# Patient Record
Sex: MALE | Race: WHITE | NOT HISPANIC OR LATINO | URBAN - METROPOLITAN AREA
[De-identification: names, ages, dates, MRNs, and addresses within clinical notes are randomized per-mention and may not be internally consistent; named-entity substitution may affect disease eponyms.]

---

## 2017-01-24 ENCOUNTER — OUTPATIENT (OUTPATIENT)
Dept: OUTPATIENT SERVICES | Facility: HOSPITAL | Age: 62
LOS: 1 days | End: 2017-01-24
Payer: COMMERCIAL

## 2017-01-24 ENCOUNTER — APPOINTMENT (OUTPATIENT)
Dept: UROLOGY | Facility: CLINIC | Age: 62
End: 2017-01-24

## 2017-01-24 ENCOUNTER — APPOINTMENT (OUTPATIENT)
Dept: CV DIAGNOSTICS | Facility: HOSPITAL | Age: 62
End: 2017-01-24

## 2017-01-24 DIAGNOSIS — E78.5 HYPERLIPIDEMIA, UNSPECIFIED: ICD-10-CM

## 2017-01-24 DIAGNOSIS — I10 ESSENTIAL (PRIMARY) HYPERTENSION: ICD-10-CM

## 2017-01-24 PROCEDURE — 93017 CV STRESS TEST TRACING ONLY: CPT

## 2017-01-24 PROCEDURE — 78452 HT MUSCLE IMAGE SPECT MULT: CPT

## 2017-01-24 PROCEDURE — 93016 CV STRESS TEST SUPVJ ONLY: CPT

## 2017-01-24 PROCEDURE — A9500: CPT

## 2017-01-24 PROCEDURE — 78452 HT MUSCLE IMAGE SPECT MULT: CPT | Mod: 26

## 2017-01-24 PROCEDURE — 93018 CV STRESS TEST I&R ONLY: CPT

## 2017-01-25 LAB — PSA SERPL-MCNC: 6.81 NG/ML

## 2017-02-13 ENCOUNTER — APPOINTMENT (OUTPATIENT)
Dept: CARDIOLOGY | Facility: CLINIC | Age: 62
End: 2017-02-13

## 2017-02-13 ENCOUNTER — APPOINTMENT (OUTPATIENT)
Dept: SURGERY | Facility: CLINIC | Age: 62
End: 2017-02-13

## 2017-02-13 VITALS
BODY MASS INDEX: 28.27 KG/M2 | DIASTOLIC BLOOD PRESSURE: 80 MMHG | SYSTOLIC BLOOD PRESSURE: 131 MMHG | HEART RATE: 70 BPM | WEIGHT: 197 LBS | OXYGEN SATURATION: 97 %

## 2017-02-13 VITALS
HEIGHT: 70 IN | WEIGHT: 195 LBS | HEART RATE: 62 BPM | SYSTOLIC BLOOD PRESSURE: 137 MMHG | DIASTOLIC BLOOD PRESSURE: 74 MMHG | BODY MASS INDEX: 27.92 KG/M2

## 2017-02-13 DIAGNOSIS — I10 ESSENTIAL (PRIMARY) HYPERTENSION: ICD-10-CM

## 2017-02-13 DIAGNOSIS — I48.0 PAROXYSMAL ATRIAL FIBRILLATION: ICD-10-CM

## 2017-02-13 DIAGNOSIS — Z78.9 OTHER SPECIFIED HEALTH STATUS: ICD-10-CM

## 2017-02-13 DIAGNOSIS — I45.10 UNSPECIFIED RIGHT BUNDLE-BRANCH BLOCK: ICD-10-CM

## 2017-02-13 DIAGNOSIS — R07.89 OTHER CHEST PAIN: ICD-10-CM

## 2017-02-13 DIAGNOSIS — E78.5 HYPERLIPIDEMIA, UNSPECIFIED: ICD-10-CM

## 2017-09-11 ENCOUNTER — APPOINTMENT (OUTPATIENT)
Dept: SURGERY | Facility: CLINIC | Age: 62
End: 2017-09-11
Payer: COMMERCIAL

## 2017-09-11 PROCEDURE — 99213 OFFICE O/P EST LOW 20 MIN: CPT

## 2017-11-15 ENCOUNTER — MEDICATION RENEWAL (OUTPATIENT)
Age: 62
End: 2017-11-15

## 2018-01-09 ENCOUNTER — APPOINTMENT (OUTPATIENT)
Dept: CARDIOLOGY | Facility: CLINIC | Age: 63
End: 2018-01-09

## 2018-01-12 ENCOUNTER — APPOINTMENT (OUTPATIENT)
Dept: UROLOGY | Facility: CLINIC | Age: 63
End: 2018-01-12
Payer: COMMERCIAL

## 2018-01-12 PROCEDURE — 99214 OFFICE O/P EST MOD 30 MIN: CPT

## 2018-06-19 ENCOUNTER — MEDICATION RENEWAL (OUTPATIENT)
Age: 63
End: 2018-06-19

## 2018-09-12 ENCOUNTER — APPOINTMENT (OUTPATIENT)
Dept: SURGERY | Facility: CLINIC | Age: 63
End: 2018-09-12
Payer: COMMERCIAL

## 2018-09-12 DIAGNOSIS — E04.2 NONTOXIC MULTINODULAR GOITER: ICD-10-CM

## 2018-09-12 PROCEDURE — 99213 OFFICE O/P EST LOW 20 MIN: CPT

## 2018-09-18 ENCOUNTER — FORM ENCOUNTER (OUTPATIENT)
Age: 63
End: 2018-09-18

## 2018-09-19 ENCOUNTER — APPOINTMENT (OUTPATIENT)
Dept: ULTRASOUND IMAGING | Facility: CLINIC | Age: 63
End: 2018-09-19
Payer: COMMERCIAL

## 2018-09-19 ENCOUNTER — OUTPATIENT (OUTPATIENT)
Dept: OUTPATIENT SERVICES | Facility: HOSPITAL | Age: 63
LOS: 1 days | End: 2018-09-19
Payer: COMMERCIAL

## 2018-09-19 DIAGNOSIS — Z00.8 ENCOUNTER FOR OTHER GENERAL EXAMINATION: ICD-10-CM

## 2018-09-19 PROCEDURE — 76536 US EXAM OF HEAD AND NECK: CPT

## 2018-09-19 PROCEDURE — 76536 US EXAM OF HEAD AND NECK: CPT | Mod: 26

## 2018-12-14 ENCOUNTER — APPOINTMENT (OUTPATIENT)
Dept: UROLOGY | Facility: CLINIC | Age: 63
End: 2018-12-14
Payer: COMMERCIAL

## 2018-12-14 PROCEDURE — 99214 OFFICE O/P EST MOD 30 MIN: CPT | Mod: 25

## 2018-12-14 PROCEDURE — 51798 US URINE CAPACITY MEASURE: CPT

## 2018-12-17 ENCOUNTER — OTHER (OUTPATIENT)
Age: 63
End: 2018-12-17

## 2018-12-17 LAB — PSA SERPL-MCNC: 13.09 NG/ML

## 2018-12-19 LAB — PSA SERPL-MCNC: 13.14 NG/ML

## 2018-12-30 ENCOUNTER — FORM ENCOUNTER (OUTPATIENT)
Age: 63
End: 2018-12-30

## 2018-12-31 ENCOUNTER — OUTPATIENT (OUTPATIENT)
Dept: OUTPATIENT SERVICES | Facility: HOSPITAL | Age: 63
LOS: 1 days | End: 2018-12-31
Payer: COMMERCIAL

## 2018-12-31 ENCOUNTER — APPOINTMENT (OUTPATIENT)
Dept: MRI IMAGING | Facility: IMAGING CENTER | Age: 63
End: 2018-12-31
Payer: COMMERCIAL

## 2018-12-31 DIAGNOSIS — R97.20 ELEVATED PROSTATE SPECIFIC ANTIGEN [PSA]: ICD-10-CM

## 2018-12-31 PROCEDURE — 82565 ASSAY OF CREATININE: CPT

## 2018-12-31 PROCEDURE — 72197 MRI PELVIS W/O & W/DYE: CPT | Mod: 26

## 2018-12-31 PROCEDURE — 72197 MRI PELVIS W/O & W/DYE: CPT

## 2018-12-31 PROCEDURE — A9585: CPT

## 2019-07-02 ENCOUNTER — APPOINTMENT (OUTPATIENT)
Dept: UROLOGY | Facility: CLINIC | Age: 64
End: 2019-07-02
Payer: COMMERCIAL

## 2019-07-02 VITALS
DIASTOLIC BLOOD PRESSURE: 61 MMHG | RESPIRATION RATE: 15 BRPM | WEIGHT: 180 LBS | BODY MASS INDEX: 25.77 KG/M2 | SYSTOLIC BLOOD PRESSURE: 105 MMHG | HEIGHT: 70 IN | HEART RATE: 62 BPM | TEMPERATURE: 98 F

## 2019-07-02 PROCEDURE — 99214 OFFICE O/P EST MOD 30 MIN: CPT

## 2019-07-02 NOTE — ASSESSMENT
[FreeTextEntry1] : Which has some time today discussing various options for further additional treatment for his BPH and urinary symptoms. We discussed the use of 5 alpha reductase inhibitors as well as PDE 5 inhibitors or anticholinergic medications. I gave him a list of medications to consider and he will do some research on his own before he decides to move forward with any of them. He was briefly on finasteride in the past but had stopped it for reasons that he could not remember. \par \par We also discussed surgical interventions for BPH and bladder outlet obstruction including transurethral resection of the prostate or laser ablative procedures of the prostate gland. We also reviewed newer steam ablation techniques.He is not ready to consider any procedural option at this time.\par \par In terms of his PSA and prostate cancer screening followup, I will recheck the PSA level today to monitor the level. Unless there is a clear indication to consider additional biopsy, we will continue to monitor him moving forward with PSA and examination of the prostate. I offered him an exam of the prostate today which he declined.

## 2019-07-02 NOTE — PHYSICAL EXAM
[General Appearance - Well Developed] : well developed [Normal Appearance] : normal appearance [General Appearance - Well Nourished] : well nourished [Well Groomed] : well groomed [Abdomen Soft] : soft [General Appearance - In No Acute Distress] : no acute distress [Abdomen Tenderness] : non-tender [Costovertebral Angle Tenderness] : no ~M costovertebral angle tenderness [Urinary Bladder Findings] : the bladder was normal on palpation [Urethral Meatus] : meatus normal [Scrotum] : the scrotum was normal [Testes Mass (___cm)] : there were no testicular masses [Edema] : no peripheral edema [] : no respiratory distress [Respiration, Rhythm And Depth] : normal respiratory rhythm and effort [Exaggerated Use Of Accessory Muscles For Inspiration] : no accessory muscle use [Oriented To Time, Place, And Person] : oriented to person, place, and time [Affect] : the affect was normal [Mood] : the mood was normal [Normal Station and Gait] : the gait and station were normal for the patient's age [Not Anxious] : not anxious [No Focal Deficits] : no focal deficits [No Palpable Adenopathy] : no palpable adenopathy

## 2019-07-02 NOTE — HISTORY OF PRESENT ILLNESS
[FreeTextEntry1] : Delon Nair returns to the office today. He is a 64-year-old man who was a prior patient of Dr. Link with history of PSA elevation and BPH with associated lower urinary tract symptoms. He is currently using Uroxatral for his urinary symptoms. He is mostly bothered by urinary urgency and frequency but also experiences nocturia x2. He typically avoids drinking before going on a house and he also a few sheets sports and tries to avoid fluid for about 5 hours before he does this were to avoid urinary urgency or frequency. He says he also has some bad night for he wakes up about every 10 or 15 minutes, up to 10 times over night to urinate but this is relatively uncommon. He would like to note there are other treatments that he could consider for management of his BPH-related urinary symptoms.\par \par His PSA level has been elevated for many years. He has undergone 2 separate prostate biopsies, most recently about 10 years ago. His PSA was checked in December 2018 and found to be approximately 13 ng/mL. He underwent MRI of the prostate at that time which showed a prostate volume of 95 cubic centimeters with PIRAD 2 designation and his PSA subsequently declined down to 8 ng/mL. Based on these findings he was recommended to continue to monitor his PSA but not that he should undergo any additional biopsies.\par \par The patient denies any abdominal pain or flank pain. There is no pain with urination. He has no fevers or chills and denies nausea or vomiting.

## 2019-07-03 ENCOUNTER — CHART COPY (OUTPATIENT)
Age: 64
End: 2019-07-03

## 2019-07-03 LAB
PSA FREE FLD-MCNC: 12 %
PSA FREE SERPL-MCNC: 1.47 NG/ML
PSA SERPL-MCNC: 11.8 NG/ML

## 2019-12-10 ENCOUNTER — APPOINTMENT (OUTPATIENT)
Dept: UROLOGY | Facility: CLINIC | Age: 64
End: 2019-12-10

## 2019-12-13 ENCOUNTER — APPOINTMENT (OUTPATIENT)
Dept: UROLOGY | Facility: CLINIC | Age: 64
End: 2019-12-13

## 2020-01-10 ENCOUNTER — APPOINTMENT (OUTPATIENT)
Dept: UROLOGY | Facility: CLINIC | Age: 65
End: 2020-01-10
Payer: COMMERCIAL

## 2020-01-10 PROCEDURE — 99214 OFFICE O/P EST MOD 30 MIN: CPT

## 2020-01-10 NOTE — PHYSICAL EXAM
[Normal Appearance] : normal appearance [General Appearance - Well Developed] : well developed [General Appearance - Well Nourished] : well nourished [Well Groomed] : well groomed [General Appearance - In No Acute Distress] : no acute distress [Abdomen Soft] : soft [Urethral Meatus] : meatus normal [Costovertebral Angle Tenderness] : no ~M costovertebral angle tenderness [Abdomen Tenderness] : non-tender [Scrotum] : the scrotum was normal [Testes Mass (___cm)] : there were no testicular masses [No Prostate Nodules] : no prostate nodules [Urinary Bladder Findings] : the bladder was normal on palpation [] : no rash [Edema] : no peripheral edema [Respiration, Rhythm And Depth] : normal respiratory rhythm and effort [Exaggerated Use Of Accessory Muscles For Inspiration] : no accessory muscle use [Oriented To Time, Place, And Person] : oriented to person, place, and time [Affect] : the affect was normal [Not Anxious] : not anxious [Mood] : the mood was normal [No Palpable Adenopathy] : no palpable adenopathy [No Focal Deficits] : no focal deficits [Normal Station and Gait] : the gait and station were normal for the patient's age

## 2020-01-10 NOTE — ASSESSMENT
[FreeTextEntry1] : For management of the lower urinary tract symptoms, he will continue on daily alfuzosin 10 mg.  We did again review the medical options of using finasteride or Cialis on a daily basis but he would prefer not to use either at this time.  His urinary symptoms seem to be under reasonable control at this time point and improved since his prior visit.\par \par Prostate examination today shows enlargement which is smooth and symmetric.  There is no evidence of any focal nodularity or induration to suggest the presence of palpable prostate cancer.\par In terms of his PSA and prostate cancer screening followup, I will recheck the PSA level today to monitor the level. Unless there is a clear indication to consider additional biopsy, we will continue to monitor him moving forward with PSA and examination of the prostate.  If the PSA shows any significant increase, repeat MRI of the prostate and possible biopsy may be considered.\par

## 2020-01-10 NOTE — HISTORY OF PRESENT ILLNESS
[FreeTextEntry1] : Mr. Lala is a 64 yom who presents for follow-up for BPH symptoms.  He was last seen July of this year.  His last PSA was ~11, however his last MRI in December of 2018 demonstrated a PIRADS 2 lesion and a significantly enlarged prostate (95cc).  He is interested in obtaining a repeat PSA today.\par \par As for his urinary symptoms, he says his nocturia is intermittent, occasionally as often as 4 times a night but if he exercises he can sleep through the night.  His symptoms are not of significant distress to him.  He was prescribed finasteride and cialis at last visit.  The patient states that he did not ever use finasteride that was discussed with him at his prior visit and he has been using Cialis for management of erectile dysfunction but on an intermittent basis.  We have also discussed that as used for BPH related urinary symptoms but he had decided against using it in that fashion.

## 2020-01-13 LAB
PSA FREE FLD-MCNC: 16 %
PSA FREE SERPL-MCNC: 1.28 NG/ML
PSA SERPL-MCNC: 7.97 NG/ML

## 2020-02-26 ENCOUNTER — APPOINTMENT (OUTPATIENT)
Dept: GASTROENTEROLOGY | Facility: CLINIC | Age: 65
End: 2020-02-26
Payer: COMMERCIAL

## 2020-02-26 VITALS
TEMPERATURE: 98.4 F | BODY MASS INDEX: 27.77 KG/M2 | HEIGHT: 70 IN | HEART RATE: 59 BPM | WEIGHT: 194 LBS | DIASTOLIC BLOOD PRESSURE: 70 MMHG | SYSTOLIC BLOOD PRESSURE: 110 MMHG | OXYGEN SATURATION: 97 %

## 2020-02-26 DIAGNOSIS — Z00.00 ENCOUNTER FOR GENERAL ADULT MEDICAL EXAMINATION W/OUT ABNORMAL FINDINGS: ICD-10-CM

## 2020-02-26 DIAGNOSIS — Z12.11 ENCOUNTER FOR SCREENING FOR MALIGNANT NEOPLASM OF COLON: ICD-10-CM

## 2020-02-26 PROCEDURE — 99203 OFFICE O/P NEW LOW 30 MIN: CPT

## 2020-02-26 RX ORDER — LOSARTAN POTASSIUM 50 MG/1
50 TABLET, FILM COATED ORAL
Qty: 90 | Refills: 0 | Status: ACTIVE | COMMUNITY
Start: 2019-10-14

## 2020-02-26 NOTE — ASSESSMENT
[FreeTextEntry1] : Impression and plan\par \par Patient presents to the office today to arrange for screening colonoscopy and to arrange for upper endoscopy for evaluation of possible esophageal complaints. The patient was advised of the risks benefits and alternatives of the examination he chooses to go ahead. We'll make further suggestions after above testing is completed and advise accordingly.

## 2020-02-26 NOTE — HISTORY OF PRESENT ILLNESS
[FreeTextEntry1] : Patient presents to the office today to arrange for colonoscopy and upper endoscopy. Patient has been experiencing some substernal chest discomfort and sought out his cardiologist who performed recent testing. Patient describes a pressure and heartburn type pain with some difficulty swallowing. Patient is also due for colonoscopy as it has been more than 5 years since his last examination. Patient currently denies nausea vomiting fever chills rectal bleeding or melena. He does notice occasional change in bowel habits. Patient is under investigation by urologist for elevated PSA.

## 2020-03-25 ENCOUNTER — APPOINTMENT (OUTPATIENT)
Dept: GASTROENTEROLOGY | Facility: AMBULATORY MEDICAL SERVICES | Age: 65
End: 2020-03-25

## 2020-07-17 ENCOUNTER — LABORATORY RESULT (OUTPATIENT)
Age: 65
End: 2020-07-17

## 2020-07-21 ENCOUNTER — APPOINTMENT (OUTPATIENT)
Dept: GASTROENTEROLOGY | Facility: AMBULATORY MEDICAL SERVICES | Age: 65
End: 2020-07-21
Payer: MEDICARE

## 2020-07-21 PROCEDURE — 43239 EGD BIOPSY SINGLE/MULTIPLE: CPT

## 2020-07-21 PROCEDURE — 45380 COLONOSCOPY AND BIOPSY: CPT

## 2020-10-23 ENCOUNTER — APPOINTMENT (OUTPATIENT)
Dept: UROLOGY | Facility: CLINIC | Age: 65
End: 2020-10-23
Payer: MEDICARE

## 2020-10-23 VITALS
DIASTOLIC BLOOD PRESSURE: 70 MMHG | WEIGHT: 190 LBS | HEART RATE: 66 BPM | RESPIRATION RATE: 17 BRPM | SYSTOLIC BLOOD PRESSURE: 120 MMHG | HEIGHT: 70 IN | BODY MASS INDEX: 27.2 KG/M2

## 2020-10-23 VITALS — TEMPERATURE: 97.1 F

## 2020-10-23 PROCEDURE — 99214 OFFICE O/P EST MOD 30 MIN: CPT

## 2020-10-23 RX ORDER — ALFUZOSIN HCL 10 MG/1
10 TABLET, EXTENDED RELEASE ORAL
Refills: 0 | Status: COMPLETED | COMMUNITY
End: 2020-10-23

## 2020-10-23 RX ORDER — SODIUM SULFATE, POTASSIUM SULFATE, MAGNESIUM SULFATE 17.5; 3.13; 1.6 G/ML; G/ML; G/ML
17.5-3.13-1.6 SOLUTION, CONCENTRATE ORAL
Qty: 1 | Refills: 0 | Status: COMPLETED | COMMUNITY
Start: 2020-02-26 | End: 2020-10-23

## 2020-10-23 RX ORDER — PANTOPRAZOLE 40 MG/1
40 TABLET, DELAYED RELEASE ORAL
Qty: 30 | Refills: 0 | Status: COMPLETED | COMMUNITY
Start: 2020-02-19 | End: 2020-10-23

## 2020-10-23 RX ORDER — PANTOPRAZOLE 40 MG/1
40 TABLET, DELAYED RELEASE ORAL
Qty: 90 | Refills: 3 | Status: COMPLETED | COMMUNITY
Start: 2020-07-23 | End: 2020-10-23

## 2020-10-23 RX ORDER — TADALAFIL 5 MG/1
5 TABLET ORAL
Qty: 90 | Refills: 3 | Status: COMPLETED | COMMUNITY
Start: 2019-07-29 | End: 2020-10-23

## 2020-10-25 LAB
APPEARANCE: CLEAR
BACTERIA UR CULT: NORMAL
BACTERIA: NEGATIVE
BILIRUBIN URINE: NEGATIVE
BLOOD URINE: NEGATIVE
COLOR: YELLOW
GLUCOSE QUALITATIVE U: NEGATIVE
HYALINE CASTS: 3 /LPF
KETONES URINE: NEGATIVE
LEUKOCYTE ESTERASE URINE: NEGATIVE
MICROSCOPIC-UA: NORMAL
NITRITE URINE: NEGATIVE
PH URINE: 5.5
PROTEIN URINE: NORMAL
PSA FREE FLD-MCNC: 18 %
PSA FREE SERPL-MCNC: 1.63 NG/ML
PSA SERPL-MCNC: 9.12 NG/ML
RED BLOOD CELLS URINE: 3 /HPF
SPECIFIC GRAVITY URINE: 1.02
SQUAMOUS EPITHELIAL CELLS: 0 /HPF
UROBILINOGEN URINE: NORMAL
WHITE BLOOD CELLS URINE: 1 /HPF

## 2020-10-25 NOTE — ASSESSMENT
[FreeTextEntry1] : I rechecked his PSA level today which is now 9.12 ng/mL.  I would interpret this as stable based on his prior PSA levels which have fluctuated over the last 3 to 4 years anywhere between 6.8 and 13.14 ng/mL.  I do not think he needs to consider undergoing either additional imaging or biopsy of his prostate at this point.\par \par With regard to the lower urinary tract symptoms, he has been on alfuzosin previously but feels as though it is not working all that well for him.  I have recommended that we change the alpha-blocker and I have added finasteride as well to try to help mitigate the symptoms.  We discussed the time course of expected effects with these medications as well as side effects that may be experienced.  He understands that there may be some sexual side effects with this including reduction of the ejaculation volume as well as reduction of the libido.  He is willing to endure the side effects for now given his increased bother with the urinary symptoms.  We also reviewed the fact that his prostate is quite significantly enlarged and that medications at times are not adequate to mitigate symptoms and in those cases, surgical intervention to relieve bladder outlet obstruction could also be considered.  He will trial the new medication combination and let me know how he does over the next several months.  We will then reassess the decision for outlet surgery if he does not see adequate improvement.

## 2020-10-25 NOTE — HISTORY OF PRESENT ILLNESS
[FreeTextEntry1] : Delon Nair returns to the office today.  He is a 65-year-old man who I have seen previously regarding BPH and lower urinary tract symptoms.  I last saw him in January of this year.  He has been noted to have PSA elevation in the past of as high as 13 ng/mL.  He had an MRI of his prostate done previously showing the prostate to be just under 100 cm³ and PI-RADS 2 designation.  For these reasons, a biopsy was deferred at that time.  He continues to have some intermittent and worsening bother related to the lower urinary tract symptoms.  He says that he now generally wakes up anywhere between 2 and 6 times overnight to urinate.  He feels that his stream is weak and that he is not emptying his bladder completely.  He denies any episodes of retention requiring catheterization of the bladder.  He also has no gross hematuria or foul odor of the urine.  He denies suprapubic or flank pain.  There is no perineal discomfort.

## 2020-10-29 ENCOUNTER — NON-APPOINTMENT (OUTPATIENT)
Age: 65
End: 2020-10-29

## 2020-12-08 ENCOUNTER — NON-APPOINTMENT (OUTPATIENT)
Age: 65
End: 2020-12-08

## 2020-12-09 ENCOUNTER — APPOINTMENT (OUTPATIENT)
Dept: UROLOGY | Facility: CLINIC | Age: 65
End: 2020-12-09
Payer: MEDICARE

## 2020-12-09 DIAGNOSIS — R10.31 RIGHT LOWER QUADRANT PAIN: ICD-10-CM

## 2020-12-09 PROCEDURE — 99214 OFFICE O/P EST MOD 30 MIN: CPT

## 2020-12-23 PROBLEM — Z12.11 ENCOUNTER FOR SCREENING COLONOSCOPY: Status: RESOLVED | Noted: 2020-02-26 | Resolved: 2020-12-23

## 2021-10-04 ENCOUNTER — APPOINTMENT (OUTPATIENT)
Dept: UROLOGY | Facility: CLINIC | Age: 66
End: 2021-10-04
Payer: MEDICARE

## 2021-10-04 DIAGNOSIS — R97.20 ELEVATED PROSTATE, SPECIFIC ANTIGEN [PSA]: ICD-10-CM

## 2021-10-04 DIAGNOSIS — N40.1 BENIGN PROSTATIC HYPERPLASIA WITH LOWER URINARY TRACT SYMPMS: ICD-10-CM

## 2021-10-04 DIAGNOSIS — N40.0 BENIGN PROSTATIC HYPERPLASIA WITHOUT LOWER URINARY TRACT SYMPMS: ICD-10-CM

## 2021-10-04 PROCEDURE — 99213 OFFICE O/P EST LOW 20 MIN: CPT

## 2021-10-04 RX ORDER — FINASTERIDE 5 MG/1
5 TABLET, FILM COATED ORAL DAILY
Qty: 90 | Refills: 3 | Status: DISCONTINUED | COMMUNITY
Start: 2020-10-23 | End: 2021-10-04

## 2022-09-20 ENCOUNTER — RX RENEWAL (OUTPATIENT)
Age: 67
End: 2022-09-20

## 2022-09-20 RX ORDER — SILODOSIN 8 MG/1
8 CAPSULE ORAL DAILY
Qty: 90 | Refills: 3 | Status: ACTIVE | COMMUNITY
Start: 2020-10-23 | End: 1900-01-01

## 2023-03-22 ENCOUNTER — NON-APPOINTMENT (OUTPATIENT)
Age: 68
End: 2023-03-22

## 2023-03-22 ENCOUNTER — APPOINTMENT (OUTPATIENT)
Dept: GASTROENTEROLOGY | Facility: CLINIC | Age: 68
End: 2023-03-22
Payer: MEDICARE

## 2023-03-22 VITALS
TEMPERATURE: 97.7 F | OXYGEN SATURATION: 98 % | DIASTOLIC BLOOD PRESSURE: 70 MMHG | WEIGHT: 189 LBS | SYSTOLIC BLOOD PRESSURE: 130 MMHG | HEART RATE: 62 BPM | HEIGHT: 70 IN | BODY MASS INDEX: 27.06 KG/M2

## 2023-03-22 DIAGNOSIS — K64.4 RESIDUAL HEMORRHOIDAL SKIN TAGS: ICD-10-CM

## 2023-03-22 PROCEDURE — 99213 OFFICE O/P EST LOW 20 MIN: CPT

## 2023-03-22 RX ORDER — HYDROCORTISONE 25 MG/G
2.5 CREAM TOPICAL DAILY
Qty: 1 | Refills: 10 | Status: ACTIVE | COMMUNITY
Start: 2023-03-22 | End: 1900-01-01

## 2023-03-25 PROBLEM — K64.4 HEMORRHOIDS, EXTERNAL: Status: ACTIVE | Noted: 2023-03-25

## 2023-03-25 NOTE — ASSESSMENT
[FreeTextEntry1] : Patient came today with complaints of perianal irritation.  Physical examination was unrevealing.  I will prescribe topical cortisone cream for the area to be applied as needed.  Patient will keep in touch by phone.  He is up-to-date on colonoscopy.  I will make further suggestions based upon patient's complaints referable to proctology if required.

## 2023-03-25 NOTE — HISTORY OF PRESENT ILLNESS
[FreeTextEntry1] : This is a delayed entry on patient who had been seen on March 22.  I am writing this note on March 25 after having spoken with the patient.  The patient had presented to the office with complaints of perianal irritation and recent history of prostatitis.  The patient is being treated by urology and has been experiencing frequent urination during these times he notices that he strains on the toilet and has had some feelings of perianal discomfort.  Patient was told that he had hemorrhoids.  He denies current nausea vomiting fever chills rectal bleeding or melena.  He has no cardiac or pulmonary complaints.  Patient requested medication for hemorrhoidal irritation.

## 2024-04-01 ENCOUNTER — APPOINTMENT (OUTPATIENT)
Dept: ORTHOPEDIC SURGERY | Facility: CLINIC | Age: 69
End: 2024-04-01
Payer: MEDICARE

## 2024-04-01 VITALS — HEIGHT: 70 IN | BODY MASS INDEX: 26.48 KG/M2 | WEIGHT: 185 LBS

## 2024-04-01 DIAGNOSIS — M23.91 UNSPECIFIED INTERNAL DERANGEMENT OF RIGHT KNEE: ICD-10-CM

## 2024-04-01 PROCEDURE — 73564 X-RAY EXAM KNEE 4 OR MORE: CPT | Mod: RT

## 2024-04-01 PROCEDURE — 99203 OFFICE O/P NEW LOW 30 MIN: CPT

## 2024-04-01 RX ORDER — ATORVASTATIN CALCIUM 80 MG/1
TABLET, FILM COATED ORAL
Refills: 0 | Status: ACTIVE | COMMUNITY

## 2024-04-01 NOTE — IMAGING
[Mild tricompartmental OA medial narrowing] : Mild tricompartmental OA medial narrowing [Right] : right knee

## 2024-04-01 NOTE — DISCUSSION/SUMMARY
[de-identified] : General Discussion The patient was advised of the diagnosis.  The natural history of the pathology was explained in full to the patient in layman's terms. All questions were answered.  The risks and benefits of surgical and non-surgical treatment alternatives were explained in full to the patient will get a mri rt knee to eval for tear or loose body  f/y after mri

## 2024-04-01 NOTE — PHYSICAL EXAM
[Right] : right knee [NL (0)] : extension 0 degrees [5___] : hamstring 5[unfilled]/5 [Equivocal] : equivocal Jewel [] : no pain with varus stress [TWNoteComboBox7] : flexion 105 degrees

## 2024-04-01 NOTE — HISTORY OF PRESENT ILLNESS
[4] : 4 [0] : 0 [Tightness] : tightness [Leisure] : leisure [Intermittent] : intermittent [Work] : work [Part time] : Work status: part time [de-identified] : Right knee pain for a few months. No known injury.  [] : no [FreeTextEntry1] : Right knee [de-identified] : driving in stop and go traffic [de-identified] : 1/2024 [de-identified] : Dr Munoz [de-identified] : xrays

## 2024-04-03 ENCOUNTER — APPOINTMENT (OUTPATIENT)
Dept: MRI IMAGING | Facility: CLINIC | Age: 69
End: 2024-04-03
Payer: MEDICARE

## 2024-04-03 PROCEDURE — 73721 MRI JNT OF LWR EXTRE W/O DYE: CPT | Mod: RT,MH

## 2024-04-05 ENCOUNTER — APPOINTMENT (OUTPATIENT)
Dept: ORTHOPEDIC SURGERY | Facility: CLINIC | Age: 69
End: 2024-04-05
Payer: MEDICARE

## 2024-04-05 VITALS — BODY MASS INDEX: 26.48 KG/M2 | HEIGHT: 70 IN | WEIGHT: 185 LBS

## 2024-04-05 PROCEDURE — 99214 OFFICE O/P EST MOD 30 MIN: CPT

## 2024-04-05 NOTE — DISCUSSION/SUMMARY
[de-identified] : General Dx Discussion The patient was advised of the diagnosis. The natural history of the pathology was explained in full to the patient in layman's terms. All questions were answered. The risks and benefits of surgical and non-surgical treatment alternatives were explained in full to the patient.  Case Discussed. MRI reviewed, has occ. catching with pain and loss of motion  Arthroscopy with partial medial / lateral; meniscectomy and debridement / synovectomy discussed Risks, benefits and alternatives discussed. Risks include, but are not limited to infection, blood clot, nerve damage, recurrent tear, loss of motion, continued pain, worsened pain, need for another surgery in the future. Discussed that the surgery will not address any pain that he has from any OA he may have. He understands he will not be 100% better after surgery. Questions answered. Understands posterior loose body may not be removeable. Understands he may need viscosupp. in the future.  He expressed understanding and would like to proceed.  The patient has two pathologic conditions at this point.  There is a meniscus tear which is causing symptomology and there is also underlying osteoarthritis.  The patient was counseled that surgical intervention to address the torn meniscus will not change the symptomology attributable to the arthritis.  The patient was advised that the pain attributable to the meniscus tear should be resolved arthroscopically.  However, the patient should expect to have continued aches and pains related to the arthritis, in the form of, but not limited to pain, weather related changes, dull ache, crepitation, limitation of motion and strength and the need for further surgeries. The patient understands that the arthroscopic procedure addresses the meniscus only and that after surgery, the knee will not be 100% but it should be improved with respect to the symptomology attributed to the torn meniscus.  Patient also is aware that further treatment after arthroscopy may be necessary in the form of oral medications, cortisone and/or viscosupplementation injections, and further surgeries including knee replacement.  The patient agrees, understands and wishes to proceed.

## 2024-04-05 NOTE — HISTORY OF PRESENT ILLNESS
[5] : 5 [0] : 0 [Tightness] : tightness [Intermittent] : intermittent [Leisure] : leisure [Work] : work [Part time] : Work status: part time [de-identified] : INESSA FOFANA is a 69 year old M here for a follow up for right knee MRI results. Pt reports that he is feeling slightly worse since the last visit.  [] : no [FreeTextEntry1] : Right knee [de-identified] : driving in stop and go traffic [de-identified] : 1/2024 [de-identified] : Dr Munoz [de-identified] : xrays

## 2024-04-05 NOTE — PHYSICAL EXAM
[Right] : right knee [NL (0)] : extension 0 degrees [5___] : hamstring 5[unfilled]/5 [Equivocal] : equivocal Jewel [] : no pain with varus stress [TWNoteComboBox7] : flexion 100 degrees

## 2024-04-05 NOTE — DATA REVIEWED
[MRI] : MRI [Right] : of the right [Knee] : knee [Report was reviewed and noted in the chart] : The report was reviewed and noted in the chart [I reviewed the films/CD] : I reviewed the films/CD [FreeTextEntry1] : mm/lm tear OA loose body

## 2024-06-19 ENCOUNTER — APPOINTMENT (OUTPATIENT)
Age: 69
End: 2024-06-19
Payer: MEDICARE

## 2024-06-19 PROCEDURE — 29880 ARTHRS KNE SRG MNISECTMY M&L: CPT | Mod: RT

## 2024-06-19 RX ORDER — OXYCODONE AND ACETAMINOPHEN 5; 325 MG/1; MG/1
5-325 TABLET ORAL EVERY 6 HOURS
Qty: 20 | Refills: 0 | Status: COMPLETED | COMMUNITY
Start: 2024-06-19 | End: 2024-06-24

## 2024-06-19 RX ORDER — ASPIRIN/ACETAMINOPHEN/CAFFEINE 500-325-65
325 POWDER IN PACKET (EA) ORAL
Qty: 30 | Refills: 0 | Status: ACTIVE | COMMUNITY
Start: 2024-06-19 | End: 2024-07-19

## 2024-06-20 ENCOUNTER — NON-APPOINTMENT (OUTPATIENT)
Age: 69
End: 2024-06-20

## 2024-06-28 ENCOUNTER — APPOINTMENT (OUTPATIENT)
Dept: ORTHOPEDIC SURGERY | Facility: CLINIC | Age: 69
End: 2024-06-28

## 2024-06-28 VITALS — WEIGHT: 185 LBS | BODY MASS INDEX: 26.48 KG/M2 | HEIGHT: 70 IN

## 2024-06-28 DIAGNOSIS — S83.241D OTHER TEAR OF MEDIAL MENISCUS, CURRENT INJURY, RIGHT KNEE, SUBSEQUENT ENCOUNTER: ICD-10-CM

## 2024-06-28 DIAGNOSIS — S83.281D OTHER TEAR OF LATERAL MENISCUS, CURRENT INJURY, RIGHT KNEE, SUBSEQUENT ENCOUNTER: ICD-10-CM

## 2024-06-28 DIAGNOSIS — M23.41 LOOSE BODY IN KNEE, RIGHT KNEE: ICD-10-CM

## 2024-06-28 DIAGNOSIS — M17.11 UNILATERAL PRIMARY OSTEOARTHRITIS, RIGHT KNEE: ICD-10-CM

## 2024-06-28 DIAGNOSIS — Z98.890 OTHER SPECIFIED POSTPROCEDURAL STATES: ICD-10-CM

## 2024-06-28 PROCEDURE — 20610 DRAIN/INJ JOINT/BURSA W/O US: CPT | Mod: 58,RT

## 2024-06-28 PROCEDURE — 99024 POSTOP FOLLOW-UP VISIT: CPT

## 2024-08-05 ENCOUNTER — APPOINTMENT (OUTPATIENT)
Dept: ORTHOPEDIC SURGERY | Facility: CLINIC | Age: 69
End: 2024-08-05

## 2024-08-05 PROCEDURE — 99024 POSTOP FOLLOW-UP VISIT: CPT

## 2024-08-05 NOTE — HISTORY OF PRESENT ILLNESS
[2] : 2 [0] : 0 [Standing] : standing [] : no [de-identified] : 6/19/24 [de-identified] : physical therapy [de-identified] : 6/19/24 [de-identified] : right knee

## 2024-08-05 NOTE — PHYSICAL EXAM
[Right] : right knee [NL (0)] : extension 0 degrees [5___] : hamstring 5[unfilled]/5 [Negative] : negative Olegario's [] : light touch is intact throughout [TWNoteComboBox7] : flexion 115 degrees

## 2024-08-05 NOTE — DISCUSSION/SUMMARY
[de-identified] : The patient was advised of the diagnosis. The natural history of the pathology was explained in full to the patient in layman's terms. The risks and benefits of surgical and non-surgical treatment alternatives were explained in full to the patient. All questions were answered.  Case discussed. cont PT f/u 5-6  weeks

## 2024-09-16 ENCOUNTER — APPOINTMENT (OUTPATIENT)
Dept: ORTHOPEDIC SURGERY | Facility: CLINIC | Age: 69
End: 2024-09-16
Payer: MEDICARE

## 2024-09-16 DIAGNOSIS — S83.281D OTHER TEAR OF LATERAL MENISCUS, CURRENT INJURY, RIGHT KNEE, SUBSEQUENT ENCOUNTER: ICD-10-CM

## 2024-09-16 DIAGNOSIS — S83.241D OTHER TEAR OF MEDIAL MENISCUS, CURRENT INJURY, RIGHT KNEE, SUBSEQUENT ENCOUNTER: ICD-10-CM

## 2024-09-16 DIAGNOSIS — M17.11 UNILATERAL PRIMARY OSTEOARTHRITIS, RIGHT KNEE: ICD-10-CM

## 2024-09-16 PROCEDURE — 99024 POSTOP FOLLOW-UP VISIT: CPT

## 2024-09-16 NOTE — PHYSICAL EXAM
[Right] : right knee [NL (0)] : extension 0 degrees [5___] : hamstring 5[unfilled]/5 [Negative] : negative Olegario's [] : no lateral joint line tenderness [TWNoteComboBox7] : flexion 125 degrees

## 2025-04-07 ENCOUNTER — APPOINTMENT (OUTPATIENT)
Dept: ORTHOPEDIC SURGERY | Facility: CLINIC | Age: 70
End: 2025-04-07
Payer: MEDICARE

## 2025-04-07 VITALS — HEIGHT: 70 IN | BODY MASS INDEX: 26.48 KG/M2 | WEIGHT: 185 LBS

## 2025-04-07 DIAGNOSIS — M17.11 UNILATERAL PRIMARY OSTEOARTHRITIS, RIGHT KNEE: ICD-10-CM

## 2025-04-07 DIAGNOSIS — M25.461 EFFUSION, RIGHT KNEE: ICD-10-CM

## 2025-04-07 PROCEDURE — 20611 DRAIN/INJ JOINT/BURSA W/US: CPT | Mod: RT

## 2025-04-07 PROCEDURE — 99213 OFFICE O/P EST LOW 20 MIN: CPT | Mod: 25

## 2025-04-07 PROCEDURE — J3490M: CUSTOM | Mod: NC

## 2025-04-21 ENCOUNTER — APPOINTMENT (OUTPATIENT)
Dept: ORTHOPEDIC SURGERY | Facility: CLINIC | Age: 70
End: 2025-04-21

## 2025-05-19 ENCOUNTER — APPOINTMENT (OUTPATIENT)
Dept: ORTHOPEDIC SURGERY | Facility: CLINIC | Age: 70
End: 2025-05-19